# Patient Record
Sex: FEMALE | Race: ASIAN | Employment: UNEMPLOYED | URBAN - METROPOLITAN AREA
[De-identification: names, ages, dates, MRNs, and addresses within clinical notes are randomized per-mention and may not be internally consistent; named-entity substitution may affect disease eponyms.]

---

## 2017-08-24 ENCOUNTER — APPOINTMENT (OUTPATIENT)
Dept: CT IMAGING | Facility: HOSPITAL | Age: 42
End: 2017-08-24
Attending: EMERGENCY MEDICINE
Payer: MEDICAID

## 2017-08-24 ENCOUNTER — HOSPITAL ENCOUNTER (EMERGENCY)
Facility: HOSPITAL | Age: 42
Discharge: HOME | End: 2017-08-24
Attending: EMERGENCY MEDICINE
Payer: MEDICAID

## 2017-08-24 VITALS
SYSTOLIC BLOOD PRESSURE: 106 MMHG | WEIGHT: 152 LBS | TEMPERATURE: 99 F | BODY MASS INDEX: 26 KG/M2 | RESPIRATION RATE: 16 BRPM | HEART RATE: 82 BPM | OXYGEN SATURATION: 100 % | DIASTOLIC BLOOD PRESSURE: 77 MMHG

## 2017-08-24 DIAGNOSIS — R51.9 NONINTRACTABLE EPISODIC HEADACHE, UNSPECIFIED HEADACHE TYPE: Primary | ICD-10-CM

## 2017-08-24 LAB
ALBUMIN SERPL-MCNC: 3.8 G/DL (ref 3.5–4.8)
ALP LIVER SERPL-CCNC: 59 U/L (ref 37–98)
ALT SERPL-CCNC: 28 U/L (ref 14–54)
AST SERPL-CCNC: 18 U/L (ref 15–41)
BASOPHILS # BLD AUTO: 0.04 X10(3) UL (ref 0–0.1)
BASOPHILS NFR BLD AUTO: 0.5 %
BILIRUB SERPL-MCNC: 0.6 MG/DL (ref 0.1–2)
BUN BLD-MCNC: 9 MG/DL (ref 8–20)
CALCIUM BLD-MCNC: 9.9 MG/DL (ref 8.3–10.3)
CHLORIDE: 106 MMOL/L (ref 101–111)
CO2: 28 MMOL/L (ref 22–32)
CREAT BLD-MCNC: 0.88 MG/DL (ref 0.55–1.02)
EOSINOPHIL # BLD AUTO: 0.22 X10(3) UL (ref 0–0.3)
EOSINOPHIL NFR BLD AUTO: 2.7 %
ERYTHROCYTE [DISTWIDTH] IN BLOOD BY AUTOMATED COUNT: 11.9 % (ref 11.5–16)
GLUCOSE BLD-MCNC: 82 MG/DL (ref 70–99)
HCT VFR BLD AUTO: 34.9 % (ref 34–50)
HGB BLD-MCNC: 12.5 G/DL (ref 12–16)
IMMATURE GRANULOCYTE COUNT: 0.02 X10(3) UL (ref 0–1)
IMMATURE GRANULOCYTE RATIO %: 0.2 %
LYMPHOCYTES # BLD AUTO: 3.39 X10(3) UL (ref 0.9–4)
LYMPHOCYTES NFR BLD AUTO: 41.7 %
M PROTEIN MFR SERPL ELPH: 7.6 G/DL (ref 6.1–8.3)
MCH RBC QN AUTO: 32.2 PG (ref 27–33.2)
MCHC RBC AUTO-ENTMCNC: 35.8 G/DL (ref 31–37)
MCV RBC AUTO: 89.9 FL (ref 81–100)
MONOCYTES # BLD AUTO: 0.84 X10(3) UL (ref 0.1–0.6)
MONOCYTES NFR BLD AUTO: 10.3 %
NEUTROPHIL ABS PRELIM: 3.62 X10 (3) UL (ref 1.3–6.7)
NEUTROPHILS # BLD AUTO: 3.62 X10(3) UL (ref 1.3–6.7)
NEUTROPHILS NFR BLD AUTO: 44.6 %
PLATELET # BLD AUTO: 252 10(3)UL (ref 150–450)
POCT LOT NUMBER: NORMAL
POCT URINE PREGNANCY: NEGATIVE
POTASSIUM SERPL-SCNC: 3.4 MMOL/L (ref 3.6–5.1)
RBC # BLD AUTO: 3.88 X10(6)UL (ref 3.8–5.1)
RED CELL DISTRIBUTION WIDTH-SD: 37.9 FL (ref 35.1–46.3)
SODIUM SERPL-SCNC: 140 MMOL/L (ref 136–144)
WBC # BLD AUTO: 8.1 X10(3) UL (ref 4–13)

## 2017-08-24 PROCEDURE — 81025 URINE PREGNANCY TEST: CPT

## 2017-08-24 PROCEDURE — 99284 EMERGENCY DEPT VISIT MOD MDM: CPT

## 2017-08-24 PROCEDURE — 96361 HYDRATE IV INFUSION ADD-ON: CPT

## 2017-08-24 PROCEDURE — 70450 CT HEAD/BRAIN W/O DYE: CPT | Performed by: EMERGENCY MEDICINE

## 2017-08-24 PROCEDURE — 96374 THER/PROPH/DIAG INJ IV PUSH: CPT

## 2017-08-24 PROCEDURE — 85025 COMPLETE CBC W/AUTO DIFF WBC: CPT | Performed by: EMERGENCY MEDICINE

## 2017-08-24 PROCEDURE — 96375 TX/PRO/DX INJ NEW DRUG ADDON: CPT

## 2017-08-24 PROCEDURE — 80053 COMPREHEN METABOLIC PANEL: CPT | Performed by: EMERGENCY MEDICINE

## 2017-08-24 RX ORDER — DIPHENHYDRAMINE HYDROCHLORIDE 50 MG/ML
25 INJECTION INTRAMUSCULAR; INTRAVENOUS ONCE
Status: COMPLETED | OUTPATIENT
Start: 2017-08-24 | End: 2017-08-24

## 2017-08-24 RX ORDER — METOCLOPRAMIDE HYDROCHLORIDE 5 MG/ML
10 INJECTION INTRAMUSCULAR; INTRAVENOUS ONCE
Status: COMPLETED | OUTPATIENT
Start: 2017-08-24 | End: 2017-08-24

## 2017-08-24 NOTE — ED NOTES
Report received from [facility] at this time. Patient resting on cart comfortably, pain decreased. No distress observed. Waiting for CT at this time.

## 2017-08-24 NOTE — ED NOTES
Patient taken to CT department at this time by cart by CT tech. Remains updated with plan of care. Daughter at bedside.

## 2017-08-24 NOTE — ED NOTES
Patient back from CT, resting on cart with family at bedside. No distress observed. Feeling better and pain controlled at this time.

## 2017-08-24 NOTE — ED PROVIDER NOTES
Patient Seen in: [facility] Emergency Department    History   Patient presents with:  Headache (neurologic)    Stated Complaint: headache    HPI    50-year-old female here with her daughter for evaluation of headache. It started yesterday morning. reactive to light. Neck: Normal range of motion. Neck supple. No JVD present. Cardiovascular: Normal rate and regular rhythm. Pulmonary/Chest: Effort normal and breath sounds normal. No stridor. Abdominal: Soft. There is no tenderness.  There is no Abnormal            Final result                 Please view results for these tests on the individual orders.    POCT PREGNANCY, URINE   RAINBOW DRAW BLUE   RAINBOW DRAW GOLD   RAINBOW DRAW LAVENDER   RAINBOW DRAW LIGHT GREEN       ========================

## 2017-08-24 NOTE — ED NOTES
Care of pt assumed from triage holding RN. Pt ambulated to BR, voided specimen to be collected.  Pt reports HA pain is \"better\"

## 2018-02-19 ENCOUNTER — OFFICE VISIT (OUTPATIENT)
Dept: FAMILY MEDICINE CLINIC | Facility: CLINIC | Age: 43
End: 2018-02-19

## 2018-02-19 VITALS
DIASTOLIC BLOOD PRESSURE: 62 MMHG | HEART RATE: 77 BPM | TEMPERATURE: 98 F | BODY MASS INDEX: 26 KG/M2 | SYSTOLIC BLOOD PRESSURE: 98 MMHG | OXYGEN SATURATION: 99 % | WEIGHT: 152 LBS

## 2018-02-19 DIAGNOSIS — J11.1 INFLUENZA-LIKE ILLNESS: Primary | ICD-10-CM

## 2018-02-19 PROCEDURE — 99202 OFFICE O/P NEW SF 15 MIN: CPT | Performed by: FAMILY MEDICINE

## 2018-02-19 RX ORDER — OSELTAMIVIR PHOSPHATE 75 MG/1
75 CAPSULE ORAL 2 TIMES DAILY
Qty: 10 CAPSULE | Refills: 0 | Status: SHIPPED | OUTPATIENT
Start: 2018-02-19 | End: 2018-02-24

## 2018-02-19 NOTE — PATIENT INSTRUCTIONS
Take tamiflu twice daily for 5 days with food. Use otc meds for comfort:  Ibuprofen for pain and fever. otc cough remedies like delsym will reduce coughing without adding mucus.    Consider applying jovita's vapo-rub or eucalpytus oil to chest and feet at

## 2018-02-20 NOTE — PROGRESS NOTES
Patient presents with:  Flu: started yesterday. chills/body aches. mild cough. sore throat.        SUBJECTIVE:   Senthil Licona is a 40year old female accompanied by  who presents complaining of flu-like symptoms of fever to 100, malaise, fatigue, Influenza-like illness (MINDY)  1. Symptomatic/supportive therapy as detailed in AVS  2. Antiviral therapy: tamiflu 75 bid x 5 days. Risks vs benefits of medications, as well as potential side effects, were reviewed.   3. Discussed possibility of second

## 2018-08-28 ENCOUNTER — OFFICE VISIT (OUTPATIENT)
Dept: FAMILY MEDICINE CLINIC | Facility: CLINIC | Age: 43
End: 2018-08-28
Payer: MEDICAID

## 2018-08-28 VITALS
DIASTOLIC BLOOD PRESSURE: 64 MMHG | SYSTOLIC BLOOD PRESSURE: 120 MMHG | TEMPERATURE: 98 F | HEIGHT: 62 IN | WEIGHT: 157 LBS | HEART RATE: 72 BPM | OXYGEN SATURATION: 100 % | BODY MASS INDEX: 28.89 KG/M2

## 2018-08-28 DIAGNOSIS — J06.9 UPPER RESPIRATORY TRACT INFECTION, UNSPECIFIED TYPE: Primary | ICD-10-CM

## 2018-08-28 PROCEDURE — 99213 OFFICE O/P EST LOW 20 MIN: CPT | Performed by: PHYSICIAN ASSISTANT

## 2018-08-28 NOTE — PROGRESS NOTES
CHIEF COMPLAINT:   Patient presents with:  Fever: sore throat, headaches, runny nose, nsoe congestion, watery eyes x 1 dy       HPI:   Danielle Brought is a 44year old female who presents for cold symptoms for 2 days. +runny nose. +headache. +sore throat. unspecified type  (primary encounter diagnosis)      Patient Instructions   Zyrtec OTC   Flonase OTC   Tylenol prn pain   Fluids   Please follow up with PCP if no improvement or if symptoms worsen         The patient indicates understanding of these issues

## 2018-08-28 NOTE — PATIENT INSTRUCTIONS
Zyrtec OTC   Flonase OTC   Tylenol prn pain   Fluids   Please follow up with PCP if no improvement or if symptoms worsen

## 2018-09-18 ENCOUNTER — OFFICE VISIT (OUTPATIENT)
Dept: FAMILY MEDICINE CLINIC | Facility: CLINIC | Age: 43
End: 2018-09-18
Payer: MEDICAID

## 2018-09-18 VITALS
DIASTOLIC BLOOD PRESSURE: 70 MMHG | SYSTOLIC BLOOD PRESSURE: 102 MMHG | HEIGHT: 60.5 IN | WEIGHT: 156.38 LBS | TEMPERATURE: 98 F | HEART RATE: 72 BPM | BODY MASS INDEX: 29.91 KG/M2 | RESPIRATION RATE: 16 BRPM | OXYGEN SATURATION: 99 %

## 2018-09-18 DIAGNOSIS — Z12.39 SCREENING FOR BREAST CANCER: ICD-10-CM

## 2018-09-18 DIAGNOSIS — K64.4 EXTERNAL HEMORRHOID: ICD-10-CM

## 2018-09-18 DIAGNOSIS — Z28.21 REFUSED INFLUENZA VACCINE: ICD-10-CM

## 2018-09-18 DIAGNOSIS — Z00.00 ROUTINE GENERAL MEDICAL EXAMINATION AT A HEALTH CARE FACILITY: Primary | ICD-10-CM

## 2018-09-18 PROCEDURE — 99396 PREV VISIT EST AGE 40-64: CPT | Performed by: FAMILY MEDICINE

## 2018-09-18 NOTE — PATIENT INSTRUCTIONS
Prevention Guidelines, Women Ages 36 to 52  Screening tests and vaccines are an important part of managing your health. A screening test is done to find possible disorders or diseases in people who don't have any symptoms.  The goal is to find a disease e Depression All women in this age group At routine exams   Gonorrhea Sexually active women at increased risk for infection At routine exams   Hepatitis C Anyone at increased risk; 1 time for those born between [facility] At routine exams   High cholester Meningococcal Women at increased risk for infection–talk with your healthcare provider 1 or more doses   Pneumococcal conjugate vaccine (PCV13) and pneumococcal polysaccharide vaccine (PPSV23) Women at increased risk for infection–talk with your healthcare Your healthcare provider may prescribe anti-inflammatory medicine to help ease your symptoms. The following tips will also help relieve pain and swelling. · Take sitz baths. Taking a sitz bath means sitting in a few inches of warm bath water.  Soaking for Drink more water  Along with a high-fiber diet, drinking more water can help ease constipation. This is because insoluble fiber absorbs water, making stools soft and bulky. Be sure to drink plenty of water throughout the day.  Drinking fruit juices, such as © 5952-1204 [employer] [address] [facility], [facility] [place]. All rights reserved. This information is not intended as a substitute for professional medical care. Always follow your healthcare professional's instructions.

## 2018-09-18 NOTE — PROGRESS NOTES
Estefania Lemons is a 44year old female here for Patient presents with:  Establish Care: New patient  Imm/Inj: refused flu vaccine    HPI:   Patient is seen for initial visit. Would like a physical done. Patient is status post LELAND for DUB.   Never had a in stool. : No pain, frequency, urgency or incontinence with urination. OB/GYN: Menses:none, had LELAND  Rheumatologic: no joint pain, swelling, stiffness. No myalgias. Derm/Skin: No rash or atypical skin lesions.   Neuro: No headache, focal neurologic s (two) times daily as needed for Hemorrhoids (for 3-5 days as needed). Refused influenza vaccine    Screening for breast cancer  -     MARIA ESTHER SCREENING BILAT (CPT=77067); Future      Recommend return for annual adult well exam in 1 year.

## 2018-12-20 ENCOUNTER — TELEPHONE (OUTPATIENT)
Dept: FAMILY MEDICINE CLINIC | Facility: CLINIC | Age: 43
End: 2018-12-20

## 2018-12-20 NOTE — TELEPHONE ENCOUNTER
Patient called requesting appointment for hemorrhoid issues. States has itching and pain. Occasionally has some bleeding. No bleeding today or this past week. States used Anusol suppositories that were prescribed at last appt. With little improvement.  Has

## 2018-12-20 NOTE — TELEPHONE ENCOUNTER
[facility] for patient to advise per , she use OTC Preparation H and Witch Hazel pads after BMs. Also advised if she is having issues with constipation, she should increase fluid and fiber intake or can use OTC Miralax or other gentle stool softener.

## 2019-01-02 ENCOUNTER — OFFICE VISIT (OUTPATIENT)
Dept: FAMILY MEDICINE CLINIC | Facility: CLINIC | Age: 44
End: 2019-01-02
Payer: MEDICAID

## 2019-01-02 VITALS
TEMPERATURE: 98 F | HEART RATE: 68 BPM | BODY MASS INDEX: 30 KG/M2 | SYSTOLIC BLOOD PRESSURE: 102 MMHG | DIASTOLIC BLOOD PRESSURE: 68 MMHG | WEIGHT: 156.5 LBS | RESPIRATION RATE: 16 BRPM

## 2019-01-02 DIAGNOSIS — K64.4 EXTERNAL HEMORRHOIDS WITH COMPLICATION: Primary | ICD-10-CM

## 2019-01-02 DIAGNOSIS — K59.09 CHRONIC CONSTIPATION: ICD-10-CM

## 2019-01-02 PROCEDURE — 99213 OFFICE O/P EST LOW 20 MIN: CPT | Performed by: FAMILY MEDICINE

## 2019-01-02 NOTE — PROGRESS NOTES
Early Kennedy is a 44year old female. Patient presents with: Follow - Up: Pt here for f/u of hemorrhoids. Pt states symptoms are the same. Pt would like a referral for specialist.    HPI:   Is seen for follow-up of hemorrhoids.   Accompanied by her da

## 2019-01-03 PROBLEM — K59.09 CHRONIC CONSTIPATION: Status: ACTIVE | Noted: 2019-01-03

## 2019-01-16 ENCOUNTER — OFFICE VISIT (OUTPATIENT)
Dept: SURGERY | Facility: CLINIC | Age: 44
End: 2019-01-16
Payer: MEDICAID

## 2019-01-16 VITALS
HEIGHT: 60 IN | DIASTOLIC BLOOD PRESSURE: 67 MMHG | WEIGHT: 156 LBS | BODY MASS INDEX: 30.63 KG/M2 | HEART RATE: 80 BPM | SYSTOLIC BLOOD PRESSURE: 104 MMHG | TEMPERATURE: 97 F

## 2019-01-16 DIAGNOSIS — K64.8 INTERNAL HEMORRHOIDS WITH COMPLICATION: Primary | ICD-10-CM

## 2019-01-16 DIAGNOSIS — K59.09 CHRONIC CONSTIPATION: ICD-10-CM

## 2019-01-16 DIAGNOSIS — L29.0 PRURITUS ANI: ICD-10-CM

## 2019-01-16 PROCEDURE — 99243 OFF/OP CNSLTJ NEW/EST LOW 30: CPT | Performed by: SURGERY

## 2019-01-16 PROCEDURE — 46600 DIAGNOSTIC ANOSCOPY SPX: CPT | Performed by: SURGERY

## 2019-01-16 NOTE — H&P
New Patient Visit Note       Active Problems      1. Internal hemorrhoids with complication    2. Chronic constipation    3. Pruritus ani        Chief Complaint   Patient presents with:  Hemorrhoids: new pt. / ref.  by Dr. Benja Mendoza / Zuhair Mcclure consult / bu activity: Yes        Partners: Male    Other Topics      Concerns:    Social History Narrative      vegetarian     No current outpatient medications on file. Review of Systems  The Review of Systems has been reviewed by me during today.   Review of Sys anal tone normal. Pelvic exam was performed with patient prone.    Genitourinary Comments: + Rectocele, small  No Rectovaginal Fistula  No Episiotomy  Anal Sphincter Intact  + mild Pruritis Ani  No Lichenification  No Abscess  No Fistula in ano  No Anterior

## 2019-01-17 PROBLEM — K64.8 INTERNAL HEMORRHOIDS WITH COMPLICATION: Status: ACTIVE | Noted: 2018-09-18

## 2019-01-28 ENCOUNTER — OFFICE VISIT (OUTPATIENT)
Dept: SURGERY | Facility: CLINIC | Age: 44
End: 2019-01-28
Payer: MEDICAID

## 2019-01-28 VITALS
DIASTOLIC BLOOD PRESSURE: 70 MMHG | SYSTOLIC BLOOD PRESSURE: 101 MMHG | BODY MASS INDEX: 30.23 KG/M2 | WEIGHT: 154 LBS | HEART RATE: 77 BPM | HEIGHT: 60 IN | TEMPERATURE: 97 F

## 2019-01-28 DIAGNOSIS — K64.8 INTERNAL HEMORRHOIDS WITH COMPLICATION: Primary | ICD-10-CM

## 2019-01-28 PROCEDURE — 46221 LIGATION OF HEMORRHOID(S): CPT | Performed by: SURGERY

## 2019-01-28 NOTE — PROCEDURES
Pre op diagnosis: Symptomatic Internal Hemorrhoids    Post op diagnosis:  Symptomatic Internal Hemorrhoids    Procedure: Anoscopy with O-ring Rubber Band Ligation of Internal Hemorrhoids, 9:00    Surgeon:  Dr. Ce Zamudio    Operative findings:    The

## 2019-01-28 NOTE — PROGRESS NOTES
Follow Up Visit Note       Active Problems      1.  Internal hemorrhoids with complication          Chief Complaint   Patient presents with:  Hemorrhoids: 1st banding         History of Present Illness  The patient is a 50-year-old female who presents today vomiting. Genitourinary: Negative for difficulty urinating, dysuria, frequency and urgency. Musculoskeletal: Negative for arthralgias and myalgias. Skin: Negative for color change and rash. Neurological: Negative for tremors, syncope and weakness.

## 2019-01-29 ENCOUNTER — TELEPHONE (OUTPATIENT)
Dept: SURGERY | Facility: CLINIC | Age: 44
End: 2019-01-29

## 2019-01-29 NOTE — TELEPHONE ENCOUNTER
Pt had her first hemorrhoid rubber banding yesterday. Her first BM after the banding was today and she stated she had quite a bit of blood noted in toilet water afterwards.  I explained to her some bleeding is normal after a banding but to call us back if s

## 2019-02-01 ENCOUNTER — TELEPHONE (OUTPATIENT)
Dept: SURGERY | Facility: CLINIC | Age: 44
End: 2019-02-01

## 2019-02-01 NOTE — TELEPHONE ENCOUNTER
Pt had banding 4 days ago and is stil having some pain and wonders if this is normal. No other complaints. Explained that this can be normal and if it does not improve or gets worse to call the office.

## 2019-02-11 ENCOUNTER — OFFICE VISIT (OUTPATIENT)
Dept: SURGERY | Facility: CLINIC | Age: 44
End: 2019-02-11
Payer: MEDICAID

## 2019-02-11 VITALS
WEIGHT: 154 LBS | BODY MASS INDEX: 30.23 KG/M2 | DIASTOLIC BLOOD PRESSURE: 68 MMHG | HEART RATE: 80 BPM | TEMPERATURE: 98 F | HEIGHT: 60 IN | SYSTOLIC BLOOD PRESSURE: 97 MMHG

## 2019-02-11 DIAGNOSIS — K64.8 INTERNAL HEMORRHOIDS WITH COMPLICATION: Primary | ICD-10-CM

## 2019-02-11 PROCEDURE — 46221 LIGATION OF HEMORRHOID(S): CPT | Performed by: SURGERY

## 2019-02-11 NOTE — PROGRESS NOTES
Follow Up Visit Note       Active Problems      1. Internal hemorrhoids with complication          Chief Complaint   Patient presents with:  Hemorrhoids: 2nd banding - ongoing pain with bowel mvmnts.  - slightly improved after 1st banding procedure nosebleeds, sore throat and trouble swallowing. Respiratory: Negative for apnea, cough, shortness of breath and wheezing. Cardiovascular: Negative for chest pain, palpitations and leg swelling.    Gastrointestinal: Negative for abdominal distention, a weeks for a repeat evaluation and probable rubberbanding. Follow Up  Return in about 2 weeks (around 2/25/2019).     Luh Arce MD

## 2019-02-11 NOTE — PROCEDURES
Pre op diagnosis: Symptomatic Internal Hemorrhoids    Post op diagnosis:  Symptomatic Internal Hemorrhoids    Procedure: Anoscopy with O-ring Rubber Band Ligation of Internal Hemorrhoids, 4:00    Surgeon:  Dr. Jorge Luis Lowe    Operative findings:    The

## 2019-02-18 ENCOUNTER — TELEPHONE (OUTPATIENT)
Dept: FAMILY MEDICINE CLINIC | Facility: CLINIC | Age: 44
End: 2019-02-18

## 2019-02-18 NOTE — TELEPHONE ENCOUNTER
Pt's daughter called stating Pt is having issues with hands & feet at night.  (not pain). Wants to come in today. Pt would not get on phone. Daughter said Mom will explain at appointment.

## 2019-02-18 NOTE — TELEPHONE ENCOUNTER
Returned call to patient's daughter. States this is not an acute issue, it has been going on for awhile. It is not painful, denies any acute weakness, numbness, tingling, etc.  Offered appt. Tomorrow am. Needs afternoon appt.     Future Appointments   Date

## 2019-02-20 ENCOUNTER — OFFICE VISIT (OUTPATIENT)
Dept: FAMILY MEDICINE CLINIC | Facility: CLINIC | Age: 44
End: 2019-02-20
Payer: MEDICAID

## 2019-02-20 VITALS
RESPIRATION RATE: 14 BRPM | WEIGHT: 160.31 LBS | TEMPERATURE: 97 F | HEIGHT: 60 IN | HEART RATE: 92 BPM | BODY MASS INDEX: 31.48 KG/M2 | OXYGEN SATURATION: 99 % | DIASTOLIC BLOOD PRESSURE: 62 MMHG | SYSTOLIC BLOOD PRESSURE: 100 MMHG

## 2019-02-20 DIAGNOSIS — M79.602 LEFT ARM PAIN: Primary | ICD-10-CM

## 2019-02-20 DIAGNOSIS — M79.605 LEFT LEG PAIN: ICD-10-CM

## 2019-02-20 PROCEDURE — 99213 OFFICE O/P EST LOW 20 MIN: CPT | Performed by: FAMILY MEDICINE

## 2019-02-20 NOTE — PROGRESS NOTES
Carlitos Jones is a 44year old female. Patient presents with:  Hand Pain: left side will start to hurt at night when laying down for six months every night  Leg Pain: left side will start to hurt at night when laying down for six months sometimes.     H leg pain  Patient advised to xiomara her labs done to make sure her electrolytes are fine.

## 2019-02-20 NOTE — PATIENT INSTRUCTIONS
Your arm pain is likely muscular, please do the exercises given in the handout regularly for 2 weeks. Please take ibuprofen 200 mg, 2 tablets every night for the next 7-10 days. Pain in your left cough appears to be cramping.   Please get your labs done

## 2019-02-23 ENCOUNTER — LAB ENCOUNTER (OUTPATIENT)
Dept: LAB | Age: 44
End: 2019-02-23
Attending: FAMILY MEDICINE

## 2019-02-23 DIAGNOSIS — Z00.00 ROUTINE GENERAL MEDICAL EXAMINATION AT A HEALTH CARE FACILITY: ICD-10-CM

## 2019-02-23 LAB
ALBUMIN SERPL-MCNC: 3.4 G/DL (ref 3.4–5)
ALBUMIN/GLOB SERPL: 1 {RATIO} (ref 1–2)
ALP LIVER SERPL-CCNC: 59 U/L (ref 37–98)
ALT SERPL-CCNC: 23 U/L (ref 13–56)
ANION GAP SERPL CALC-SCNC: 7 MMOL/L (ref 0–18)
AST SERPL-CCNC: 22 U/L (ref 15–37)
BASOPHILS # BLD AUTO: 0.03 X10(3) UL (ref 0–0.2)
BASOPHILS NFR BLD AUTO: 0.5 %
BILIRUB SERPL-MCNC: 0.5 MG/DL (ref 0.1–2)
BUN BLD-MCNC: 8 MG/DL (ref 7–18)
BUN/CREAT SERPL: 11.3 (ref 10–20)
CALCIUM BLD-MCNC: 9.2 MG/DL (ref 8.5–10.1)
CHLORIDE SERPL-SCNC: 106 MMOL/L (ref 98–107)
CHOLEST SMN-MCNC: 148 MG/DL (ref ?–200)
CO2 SERPL-SCNC: 27 MMOL/L (ref 21–32)
CREAT BLD-MCNC: 0.71 MG/DL (ref 0.55–1.02)
DEPRECATED RDW RBC AUTO: 38.5 FL (ref 35.1–46.3)
EOSINOPHIL # BLD AUTO: 0.17 X10(3) UL (ref 0–0.7)
EOSINOPHIL NFR BLD AUTO: 2.9 %
ERYTHROCYTE [DISTWIDTH] IN BLOOD BY AUTOMATED COUNT: 11.6 % (ref 11–15)
GLOBULIN PLAS-MCNC: 3.4 G/DL (ref 2.8–4.4)
GLUCOSE BLD-MCNC: 89 MG/DL (ref 70–99)
HCT VFR BLD AUTO: 34.9 % (ref 35–48)
HDLC SERPL-MCNC: 54 MG/DL (ref 40–59)
HGB BLD-MCNC: 12.7 G/DL (ref 12–16)
IMM GRANULOCYTES # BLD AUTO: 0.02 X10(3) UL (ref 0–1)
IMM GRANULOCYTES NFR BLD: 0.3 %
LDLC SERPL CALC-MCNC: 78 MG/DL (ref ?–100)
LYMPHOCYTES # BLD AUTO: 1.95 X10(3) UL (ref 1–4)
LYMPHOCYTES NFR BLD AUTO: 33 %
M PROTEIN MFR SERPL ELPH: 6.8 G/DL (ref 6.4–8.2)
MCH RBC QN AUTO: 33 PG (ref 26–34)
MCHC RBC AUTO-ENTMCNC: 36.4 G/DL (ref 31–37)
MCV RBC AUTO: 90.6 FL (ref 80–100)
MONOCYTES # BLD AUTO: 0.63 X10(3) UL (ref 0.1–1)
MONOCYTES NFR BLD AUTO: 10.7 %
NEUTROPHILS # BLD AUTO: 3.11 X10 (3) UL (ref 1.5–7.7)
NEUTROPHILS # BLD AUTO: 3.11 X10(3) UL (ref 1.5–7.7)
NEUTROPHILS NFR BLD AUTO: 52.6 %
NONHDLC SERPL-MCNC: 94 MG/DL (ref ?–130)
OSMOLALITY SERPL CALC.SUM OF ELEC: 288 MOSM/KG (ref 275–295)
PLATELET # BLD AUTO: 261 10(3)UL (ref 150–450)
POTASSIUM SERPL-SCNC: 4.1 MMOL/L (ref 3.5–5.1)
RBC # BLD AUTO: 3.85 X10(6)UL (ref 3.8–5.3)
SODIUM SERPL-SCNC: 140 MMOL/L (ref 136–145)
TRIGL SERPL-MCNC: 81 MG/DL (ref 30–149)
TSI SER-ACNC: 1.62 MIU/ML (ref 0.36–3.74)
VIT D+METAB SERPL-MCNC: 15.2 NG/ML (ref 30–100)
VLDLC SERPL CALC-MCNC: 16 MG/DL (ref 0–30)
WBC # BLD AUTO: 5.9 X10(3) UL (ref 4–11)

## 2019-02-23 PROCEDURE — 80061 LIPID PANEL: CPT | Performed by: FAMILY MEDICINE

## 2019-02-23 PROCEDURE — 82306 VITAMIN D 25 HYDROXY: CPT | Performed by: FAMILY MEDICINE

## 2019-02-23 PROCEDURE — 80050 GENERAL HEALTH PANEL: CPT | Performed by: FAMILY MEDICINE

## 2019-02-25 ENCOUNTER — OFFICE VISIT (OUTPATIENT)
Dept: SURGERY | Facility: CLINIC | Age: 44
End: 2019-02-25
Payer: MEDICAID

## 2019-02-25 VITALS
DIASTOLIC BLOOD PRESSURE: 63 MMHG | WEIGHT: 160 LBS | HEIGHT: 60 IN | TEMPERATURE: 97 F | BODY MASS INDEX: 31.41 KG/M2 | HEART RATE: 64 BPM | SYSTOLIC BLOOD PRESSURE: 97 MMHG

## 2019-02-25 DIAGNOSIS — K59.09 CHRONIC CONSTIPATION: ICD-10-CM

## 2019-02-25 DIAGNOSIS — K60.0 ACUTE ANTERIOR ANAL FISSURE: Primary | ICD-10-CM

## 2019-02-25 PROCEDURE — 99213 OFFICE O/P EST LOW 20 MIN: CPT | Performed by: SURGERY

## 2019-02-25 NOTE — PROGRESS NOTES
Follow Up Visit Note       Active Problems      1. Acute anterior anal fissure    2. Chronic constipation          Chief Complaint   Patient presents with:  Anal Problem (GI):  pt c/o of constipation, rectal pain x 2 days.  denies bleeding        History of swallowing. Respiratory: Negative for apnea, cough, shortness of breath and wheezing. Cardiovascular: Negative for chest pain, palpitations and leg swelling. Gastrointestinal: Positive for rectal pain.  Negative for abdominal distention, abdominal p Because of the pain associated with her fissure, I did not pursue rubberbanding at this time. · I recommended she start a fiber supplement. She was provided with a handout regarding a high-fiber diet, which includes taking a fiber supplement routinely.

## 2019-03-18 ENCOUNTER — OFFICE VISIT (OUTPATIENT)
Dept: SURGERY | Facility: CLINIC | Age: 44
End: 2019-03-18
Payer: MEDICAID

## 2019-03-18 VITALS
WEIGHT: 160 LBS | DIASTOLIC BLOOD PRESSURE: 65 MMHG | HEART RATE: 84 BPM | BODY MASS INDEX: 31.41 KG/M2 | HEIGHT: 60 IN | TEMPERATURE: 98 F | SYSTOLIC BLOOD PRESSURE: 102 MMHG

## 2019-03-18 DIAGNOSIS — K59.09 CHRONIC CONSTIPATION: ICD-10-CM

## 2019-03-18 DIAGNOSIS — K60.0 ACUTE ANTERIOR ANAL FISSURE: Primary | ICD-10-CM

## 2019-03-18 PROCEDURE — 99213 OFFICE O/P EST LOW 20 MIN: CPT | Performed by: SURGERY

## 2019-03-18 NOTE — PROGRESS NOTES
Follow Up Visit Note       Active Problems      1. Acute anterior anal fissure    2. Chronic constipation          Chief Complaint   Patient presents with:  Hemorrhoids: fissure cont. care - possible banding /  still having issues w/bowel mvmnts. , pushes a 4 capsule, Rfl: 2     Review of Systems  The Review of Systems has been reviewed by me during today. Review of Systems   Constitutional: Negative for chills, diaphoresis, fatigue, fever and unexpected weight change.    HENT: Negative for hearing loss, nose normal. Judgment and thought content normal.   Nursing note and vitals reviewed. Assessment   Acute anterior anal fissure  (primary encounter diagnosis)  Chronic constipation    Plan   · The fissure has healed significantly, but not entirely.   I aske

## 2019-04-08 ENCOUNTER — OFFICE VISIT (OUTPATIENT)
Dept: SURGERY | Facility: CLINIC | Age: 44
End: 2019-04-08
Payer: MEDICAID

## 2019-04-08 VITALS
RESPIRATION RATE: 18 BRPM | BODY MASS INDEX: 29.44 KG/M2 | HEIGHT: 62 IN | DIASTOLIC BLOOD PRESSURE: 67 MMHG | TEMPERATURE: 97 F | WEIGHT: 160 LBS | SYSTOLIC BLOOD PRESSURE: 95 MMHG | HEART RATE: 83 BPM

## 2019-04-08 DIAGNOSIS — K64.8 INTERNAL HEMORRHOIDS WITH COMPLICATION: Primary | ICD-10-CM

## 2019-04-08 DIAGNOSIS — K60.0 ACUTE ANTERIOR ANAL FISSURE: ICD-10-CM

## 2019-04-08 DIAGNOSIS — K59.09 CHRONIC CONSTIPATION: ICD-10-CM

## 2019-04-08 PROCEDURE — 99213 OFFICE O/P EST LOW 20 MIN: CPT | Performed by: SURGERY

## 2019-04-08 RX ORDER — POLYETHYLENE GLYCOL 3350 17 G/17G
17 POWDER, FOR SOLUTION ORAL DAILY
COMMUNITY
End: 2019-07-01

## 2019-04-08 NOTE — PROCEDURES
Pre op diagnosis: Symptomatic Internal Hemorrhoids    Post op diagnosis:  Symptomatic Internal Hemorrhoids    Procedure: Anoscopy with O-ring Rubber Band Ligation of Internal Hemorrhoids, 2:00    Surgeon:  Dr. Summers Forth    Operative findings:    The

## 2019-04-08 NOTE — PROGRESS NOTES
Follow Up Visit Note       Active Problems      1. Internal hemorrhoids with complication    2. Chronic constipation    3.  Acute anterior anal fissure          Chief Complaint   Patient presents with:  Anal Problem (GI): Continued care fissure,doing better Frequency: Never      Drug use: No      Sexual activity: Yes        Partners: Male    Other Topics      Concerns:    Social History Narrative      vegetarian       Current Outpatient Medications:   •  NON FORMULARY, ditalizem and lidocainr oint for fi thyroid mass present. Cardiovascular: Normal rate, regular rhythm, S1 normal and S2 normal. Exam reveals no S3.   Pulmonary/Chest: Effort normal. No accessory muscle usage. No tachypnea. No respiratory distress. She has no decreased breath sounds.  She ha and complete.

## 2019-06-21 ENCOUNTER — TELEPHONE (OUTPATIENT)
Dept: FAMILY MEDICINE CLINIC | Facility: CLINIC | Age: 44
End: 2019-06-21

## 2019-06-21 NOTE — TELEPHONE ENCOUNTER
Attempted to return call to patient. No answer and no voicemail available on cell phone number. Called number listed as home number and reached male relative. He will contact patient and tell her to call back.

## 2019-06-22 DIAGNOSIS — E55.9 VITAMIN D DEFICIENCY: ICD-10-CM

## 2019-06-24 NOTE — TELEPHONE ENCOUNTER
LOV  Return in about 2 weeks (around 3/6/2019).        LAST LAB    19  8:15 AM    25-Hydroxyvitamin D (Total) 30.0 - 100.0 ng/mL 15.2Low           LAST RX   ergocalciferol 17342 units Oral Cap () 4 capsule 2 2019 3/30/2019       Next O

## 2019-06-26 ENCOUNTER — OFFICE VISIT (OUTPATIENT)
Dept: FAMILY MEDICINE CLINIC | Facility: CLINIC | Age: 44
End: 2019-06-26
Payer: MEDICAID

## 2019-06-26 ENCOUNTER — TELEPHONE (OUTPATIENT)
Dept: FAMILY MEDICINE CLINIC | Facility: CLINIC | Age: 44
End: 2019-06-26

## 2019-06-26 VITALS
TEMPERATURE: 98 F | DIASTOLIC BLOOD PRESSURE: 68 MMHG | HEIGHT: 61 IN | HEART RATE: 82 BPM | WEIGHT: 163.13 LBS | OXYGEN SATURATION: 99 % | BODY MASS INDEX: 30.8 KG/M2 | RESPIRATION RATE: 18 BRPM | SYSTOLIC BLOOD PRESSURE: 106 MMHG

## 2019-06-26 DIAGNOSIS — R25.2 LEG CRAMP: ICD-10-CM

## 2019-06-26 DIAGNOSIS — R51.9 FRONTAL HEADACHE: Primary | ICD-10-CM

## 2019-06-26 DIAGNOSIS — E55.9 VITAMIN D DEFICIENCY: ICD-10-CM

## 2019-06-26 PROCEDURE — 99213 OFFICE O/P EST LOW 20 MIN: CPT | Performed by: FAMILY MEDICINE

## 2019-06-26 RX ORDER — ERGOCALCIFEROL 1.25 MG/1
CAPSULE ORAL
Qty: 4 CAPSULE | Refills: 0 | OUTPATIENT
Start: 2019-06-26

## 2019-06-26 NOTE — PATIENT INSTRUCTIONS
Please keep a headache diary for 1 month and follow up. Continue to take advil as needed for the headache. Self-Care for Headaches  Most headaches aren't serious and can be relieved with self-care.  But some headaches may be a sign of another health pr definite beginning or end  · Headache after an activity such as driving or working on a computer  Signs of migraine  Any of the following can be signs:  · Throbbing pain on one or both sides of your head  · Nausea or vomiting  · Extreme sensitivity to ligh pills  · Certain medical conditions, such as kidney failure or diabetes  · Pregnancy  Stopping a muscle spasm  Muscle spasms often come and go quickly. When a muscle goes into spasm, very gently stretch and massage the muscle.  This may help calm the muscle

## 2019-06-26 NOTE — TELEPHONE ENCOUNTER
Patient's daughter returned call. States her mother had two episode of generalized headache and vomiting last week. Felt cold but denies fever, diarrhea, URI symptoms or other symptoms. Not related to any particular activity or time of day.   Last episode

## 2019-06-28 NOTE — TELEPHONE ENCOUNTER
LOV  6/26/19   Frontal Headache    LAST LAB 2/23/19   Vit D  15.2    LAST RX 2/28/19  #4  2 refills    Next OV   Future Appointments   Date Time Provider Alejandra Orozco   7/1/2019  2:15 PM Gurjit Tadeo MD [facility] EMG General     PROTOCOL- NONE

## 2019-06-30 RX ORDER — ERGOCALCIFEROL 1.25 MG/1
CAPSULE ORAL
Qty: 4 CAPSULE | Refills: 0 | Status: SHIPPED | OUTPATIENT
Start: 2019-06-30 | End: 2020-01-20

## 2019-07-01 ENCOUNTER — OFFICE VISIT (OUTPATIENT)
Dept: SURGERY | Facility: CLINIC | Age: 44
End: 2019-07-01
Payer: MEDICAID

## 2019-07-01 VITALS
SYSTOLIC BLOOD PRESSURE: 89 MMHG | WEIGHT: 160 LBS | HEART RATE: 76 BPM | HEIGHT: 61 IN | BODY MASS INDEX: 30.21 KG/M2 | DIASTOLIC BLOOD PRESSURE: 62 MMHG | TEMPERATURE: 97 F

## 2019-07-01 DIAGNOSIS — T14.8XXA SUPERFICIAL LACERATION OF SKIN: Primary | ICD-10-CM

## 2019-07-01 PROCEDURE — 99214 OFFICE O/P EST MOD 30 MIN: CPT | Performed by: SURGERY

## 2019-07-01 NOTE — PROGRESS NOTES
Early Kennedy is a 38year old female.   Patient presents with:  Headache: Two headaches last week with nausea,vomiting here to discuss    HPI:   Patient is seen today accompanied by her daughter, complaining of frontal headache, patient states had 2 epi

## 2019-07-01 NOTE — PROGRESS NOTES
Follow Up Visit Note       Active Problems      1.  Superficial laceration of skin          Chief Complaint   Patient presents with:  Hemorrhoids: EST PT, SEEN IN 04/2019 FOR HEMORRHOID BANDINGS, STILL C/O ONGOING HEMORRHOID ISSUES, c/o scar by rectum that Never      Drug use: No      Sexual activity: Yes        Partners: Male    Other Topics      Concerns:    Social History Narrative      vegetarian       Current Outpatient Medications:   •  ERGOCALCIFEROL 28760 units Oral Cap, TAKE 1 CAPSULE BY MOUTH 1 LYNETTE her handout with further details.     Travis Pineda MD

## 2020-01-09 ENCOUNTER — OFFICE VISIT (OUTPATIENT)
Dept: ELECTROPHYSIOLOGY | Facility: HOSPITAL | Age: 45
End: 2020-01-09
Attending: FAMILY MEDICINE
Payer: MEDICAID

## 2020-01-09 DIAGNOSIS — M54.40 ACUTE LEFT-SIDED LOW BACK PAIN WITH SCIATICA, SCIATICA LATERALITY UNSPECIFIED: Primary | ICD-10-CM

## 2020-01-09 DIAGNOSIS — M54.40 LOW BACK PAIN WITH SCIATICA, SCIATICA LATERALITY UNSPECIFIED, UNSPECIFIED BACK PAIN LATERALITY, UNSPECIFIED CHRONICITY: ICD-10-CM

## 2020-01-09 PROBLEM — M54.50 LOW BACK PAIN: Status: ACTIVE | Noted: 2020-01-09

## 2020-01-09 PROCEDURE — 95886 MUSC TEST DONE W/N TEST COMP: CPT | Performed by: OTHER

## 2020-01-09 PROCEDURE — 95909 NRV CNDJ TST 5-6 STUDIES: CPT | Performed by: OTHER

## 2020-01-09 NOTE — PROCEDURES
[address]      PATIENT'S NAME: Olamide Kwabenajared   REFERRING PHYSICIAN: Harry Santizo M.D.    PATIENT ACCOUNT #: [de-identified] LOCATION: [facility]   MEDICAL RECORD #: [id number] DATE OF BIRTH:

## 2020-01-09 NOTE — PROCEDURES
[address]      PATIENT'S NAME: Roz Ghosh   REFERRING PHYSICIAN: Herrera Hammer M.D.    PATIENT ACCOUNT #: [de-identified] LOCATION: [facility]   MEDICAL RECORD #: [id number] DATE OF BIRTH:

## 2020-01-14 ENCOUNTER — OFFICE VISIT (OUTPATIENT)
Dept: FAMILY MEDICINE CLINIC | Facility: CLINIC | Age: 45
End: 2020-01-14
Payer: MEDICAID

## 2020-01-14 VITALS
SYSTOLIC BLOOD PRESSURE: 102 MMHG | TEMPERATURE: 98 F | HEIGHT: 61 IN | OXYGEN SATURATION: 99 % | WEIGHT: 159 LBS | DIASTOLIC BLOOD PRESSURE: 68 MMHG | RESPIRATION RATE: 16 BRPM | HEART RATE: 83 BPM | BODY MASS INDEX: 30.02 KG/M2

## 2020-01-14 DIAGNOSIS — Z12.39 SCREENING FOR BREAST CANCER: ICD-10-CM

## 2020-01-14 DIAGNOSIS — R20.2 PARESTHESIA OF BOTH FEET: ICD-10-CM

## 2020-01-14 DIAGNOSIS — E55.9 VITAMIN D DEFICIENCY: ICD-10-CM

## 2020-01-14 DIAGNOSIS — Z00.00 ROUTINE GENERAL MEDICAL EXAMINATION AT A HEALTH CARE FACILITY: Primary | ICD-10-CM

## 2020-01-14 PROCEDURE — 99396 PREV VISIT EST AGE 40-64: CPT | Performed by: FAMILY MEDICINE

## 2020-01-14 NOTE — PATIENT INSTRUCTIONS
Prevention Guidelines, Women Ages [address] to 52  Screening tests and vaccines are an important part of managing your health. A screening test is done to find diseases in people who don't have any symptoms.  The goal is to find a disease early so lifestyle diamond · Flexible sigmoidoscopy every 5 years, or  · Colonoscopy every 10 years, or  · CT colonography (virtual colonoscopy) every 5 years, or  · Yearly fecal occult blood test, or  · Yearly fecal immunochemical test every year, or  · Stool DNA test, every 3 year Chickenpox (varicella) All women in this age group who have no record of this infection or vaccine 2 doses; the second dose should be given at least 4 weeks after the first dose   Hepatitis A Women at increased risk for infection–talk with your healthcare Use of tobacco and the health effects it can cause All women in this age group Every exam   1 American Diabetes Association  2 American College of Obstetricians and Gynecologists   [address]  [address] [facility]  Date Last R

## 2020-01-14 NOTE — PROGRESS NOTES
Ai Sánchez is a 38year old female here for Patient presents with:  Physical    HPI:   Patient is seen for her annual physical  Patient is status post LELAND for DUB.   Mammogram was not done last year, patient states not sure of family history of breast myalgias. Derm/Skin: No rash or atypical skin lesions. Neuro: No headache, focal neurologic symptom or memory difficulty. Psych: No anxiety, depression, sleep disturbance, panic attacks.     EXAM:     /68   Pulse 83   Temp 97.8 °F (36.6 °C) (Tempor

## 2020-01-18 ENCOUNTER — LAB ENCOUNTER (OUTPATIENT)
Dept: LAB | Age: 45
End: 2020-01-18
Attending: FAMILY MEDICINE
Payer: MEDICAID

## 2020-01-18 DIAGNOSIS — E55.9 VITAMIN D DEFICIENCY: ICD-10-CM

## 2020-01-18 DIAGNOSIS — R20.2 PARESTHESIA OF BOTH FEET: ICD-10-CM

## 2020-01-18 DIAGNOSIS — Z00.00 ROUTINE GENERAL MEDICAL EXAMINATION AT A HEALTH CARE FACILITY: ICD-10-CM

## 2020-01-18 LAB
ALBUMIN SERPL-MCNC: 3.6 G/DL (ref 3.4–5)
ALBUMIN/GLOB SERPL: 1 {RATIO} (ref 1–2)
ALP LIVER SERPL-CCNC: 58 U/L (ref 37–98)
ALT SERPL-CCNC: 22 U/L (ref 13–56)
ANION GAP SERPL CALC-SCNC: 6 MMOL/L (ref 0–18)
AST SERPL-CCNC: 14 U/L (ref 15–37)
BASOPHILS # BLD AUTO: 0.05 X10(3) UL (ref 0–0.2)
BASOPHILS NFR BLD AUTO: 0.8 %
BILIRUB SERPL-MCNC: 0.7 MG/DL (ref 0.1–2)
BUN BLD-MCNC: 7 MG/DL (ref 7–18)
BUN/CREAT SERPL: 8.5 (ref 10–20)
CALCIUM BLD-MCNC: 8.8 MG/DL (ref 8.5–10.1)
CHLORIDE SERPL-SCNC: 108 MMOL/L (ref 98–112)
CHOLEST SMN-MCNC: 163 MG/DL (ref ?–200)
CO2 SERPL-SCNC: 27 MMOL/L (ref 21–32)
CREAT BLD-MCNC: 0.82 MG/DL (ref 0.55–1.02)
DEPRECATED RDW RBC AUTO: 39.2 FL (ref 35.1–46.3)
EOSINOPHIL # BLD AUTO: 0.18 X10(3) UL (ref 0–0.7)
EOSINOPHIL NFR BLD AUTO: 3 %
ERYTHROCYTE [DISTWIDTH] IN BLOOD BY AUTOMATED COUNT: 11.6 % (ref 11–15)
GLOBULIN PLAS-MCNC: 3.5 G/DL (ref 2.8–4.4)
GLUCOSE BLD-MCNC: 96 MG/DL (ref 70–99)
HCT VFR BLD AUTO: 38.6 % (ref 35–48)
HDLC SERPL-MCNC: 52 MG/DL (ref 40–59)
HGB BLD-MCNC: 13.3 G/DL (ref 12–16)
IMM GRANULOCYTES # BLD AUTO: 0.01 X10(3) UL (ref 0–1)
IMM GRANULOCYTES NFR BLD: 0.2 %
LDLC SERPL CALC-MCNC: 98 MG/DL (ref ?–100)
LYMPHOCYTES # BLD AUTO: 2.3 X10(3) UL (ref 1–4)
LYMPHOCYTES NFR BLD AUTO: 38.1 %
M PROTEIN MFR SERPL ELPH: 7.1 G/DL (ref 6.4–8.2)
MCH RBC QN AUTO: 32.2 PG (ref 26–34)
MCHC RBC AUTO-ENTMCNC: 34.5 G/DL (ref 31–37)
MCV RBC AUTO: 93.5 FL (ref 80–100)
MONOCYTES # BLD AUTO: 0.62 X10(3) UL (ref 0.1–1)
MONOCYTES NFR BLD AUTO: 10.3 %
NEUTROPHILS # BLD AUTO: 2.87 X10 (3) UL (ref 1.5–7.7)
NEUTROPHILS # BLD AUTO: 2.87 X10(3) UL (ref 1.5–7.7)
NEUTROPHILS NFR BLD AUTO: 47.6 %
NONHDLC SERPL-MCNC: 111 MG/DL (ref ?–130)
OSMOLALITY SERPL CALC.SUM OF ELEC: 290 MOSM/KG (ref 275–295)
PATIENT FASTING Y/N/NP: YES
PATIENT FASTING Y/N/NP: YES
PLATELET # BLD AUTO: 270 10(3)UL (ref 150–450)
POTASSIUM SERPL-SCNC: 3.9 MMOL/L (ref 3.5–5.1)
RBC # BLD AUTO: 4.13 X10(6)UL (ref 3.8–5.3)
SODIUM SERPL-SCNC: 141 MMOL/L (ref 136–145)
TRIGL SERPL-MCNC: 66 MG/DL (ref 30–149)
TSI SER-ACNC: 1.64 MIU/ML (ref 0.36–3.74)
VIT B12 SERPL-MCNC: 217 PG/ML (ref 193–986)
VIT D+METAB SERPL-MCNC: 26.2 NG/ML (ref 30–100)
VLDLC SERPL CALC-MCNC: 13 MG/DL (ref 0–30)
WBC # BLD AUTO: 6 X10(3) UL (ref 4–11)

## 2020-01-18 PROCEDURE — 80053 COMPREHEN METABOLIC PANEL: CPT

## 2020-01-18 PROCEDURE — 85025 COMPLETE CBC W/AUTO DIFF WBC: CPT

## 2020-01-18 PROCEDURE — 84443 ASSAY THYROID STIM HORMONE: CPT

## 2020-01-18 PROCEDURE — 82306 VITAMIN D 25 HYDROXY: CPT

## 2020-01-18 PROCEDURE — 80061 LIPID PANEL: CPT

## 2020-01-18 PROCEDURE — 82607 VITAMIN B-12: CPT

## 2020-01-26 PROBLEM — E55.9 VITAMIN D DEFICIENCY: Status: ACTIVE | Noted: 2020-01-26

## 2020-01-26 PROBLEM — R20.2 PARESTHESIA OF BOTH FEET: Status: ACTIVE | Noted: 2020-01-26

## 2020-01-28 ENCOUNTER — TELEPHONE (OUTPATIENT)
Dept: FAMILY MEDICINE CLINIC | Facility: CLINIC | Age: 45
End: 2020-01-28

## 2020-01-28 ENCOUNTER — HOSPITAL ENCOUNTER (EMERGENCY)
Facility: HOSPITAL | Age: 45
Discharge: HOME | End: 2020-01-28
Attending: EMERGENCY MEDICINE
Payer: MEDICAID

## 2020-01-28 VITALS
SYSTOLIC BLOOD PRESSURE: 112 MMHG | OXYGEN SATURATION: 100 % | WEIGHT: 159 LBS | TEMPERATURE: 98 F | DIASTOLIC BLOOD PRESSURE: 74 MMHG | BODY MASS INDEX: 28.17 KG/M2 | HEIGHT: 63 IN | RESPIRATION RATE: 18 BRPM | HEART RATE: 82 BPM

## 2020-01-28 DIAGNOSIS — G62.9 PERIPHERAL POLYNEUROPATHY: Primary | ICD-10-CM

## 2020-01-28 LAB
ALBUMIN SERPL-MCNC: 3.4 G/DL (ref 3.4–5)
ALBUMIN/GLOB SERPL: 0.9 {RATIO} (ref 1–2)
ALP LIVER SERPL-CCNC: 60 U/L (ref 37–98)
ALT SERPL-CCNC: 22 U/L (ref 13–56)
ANION GAP SERPL CALC-SCNC: 7 MMOL/L (ref 0–18)
AST SERPL-CCNC: 23 U/L (ref 15–37)
BASOPHILS # BLD AUTO: 0.04 X10(3) UL (ref 0–0.2)
BASOPHILS NFR BLD AUTO: 0.4 %
BILIRUB SERPL-MCNC: 0.5 MG/DL (ref 0.1–2)
BUN BLD-MCNC: 8 MG/DL (ref 7–18)
BUN/CREAT SERPL: 10.3 (ref 10–20)
CALCIUM BLD-MCNC: 8.8 MG/DL (ref 8.5–10.1)
CHLORIDE SERPL-SCNC: 109 MMOL/L (ref 98–112)
CO2 SERPL-SCNC: 26 MMOL/L (ref 21–32)
CREAT BLD-MCNC: 0.78 MG/DL (ref 0.55–1.02)
DEPRECATED RDW RBC AUTO: 38.6 FL (ref 35.1–46.3)
EOSINOPHIL # BLD AUTO: 0.17 X10(3) UL (ref 0–0.7)
EOSINOPHIL NFR BLD AUTO: 1.9 %
ERYTHROCYTE [DISTWIDTH] IN BLOOD BY AUTOMATED COUNT: 11.6 % (ref 11–15)
GLOBULIN PLAS-MCNC: 3.6 G/DL (ref 2.8–4.4)
GLUCOSE BLD-MCNC: 99 MG/DL (ref 70–99)
HCT VFR BLD AUTO: 37.1 % (ref 35–48)
HGB BLD-MCNC: 12.9 G/DL (ref 12–16)
IMM GRANULOCYTES # BLD AUTO: 0.01 X10(3) UL (ref 0–1)
IMM GRANULOCYTES NFR BLD: 0.1 %
LYMPHOCYTES # BLD AUTO: 2.99 X10(3) UL (ref 1–4)
LYMPHOCYTES NFR BLD AUTO: 33.4 %
M PROTEIN MFR SERPL ELPH: 7 G/DL (ref 6.4–8.2)
MCH RBC QN AUTO: 31.8 PG (ref 26–34)
MCHC RBC AUTO-ENTMCNC: 34.8 G/DL (ref 31–37)
MCV RBC AUTO: 91.4 FL (ref 80–100)
MONOCYTES # BLD AUTO: 0.46 X10(3) UL (ref 0.1–1)
MONOCYTES NFR BLD AUTO: 5.1 %
NEUTROPHILS # BLD AUTO: 5.29 X10 (3) UL (ref 1.5–7.7)
NEUTROPHILS # BLD AUTO: 5.29 X10(3) UL (ref 1.5–7.7)
NEUTROPHILS NFR BLD AUTO: 59.1 %
OSMOLALITY SERPL CALC.SUM OF ELEC: 292 MOSM/KG (ref 275–295)
PLATELET # BLD AUTO: 246 10(3)UL (ref 150–450)
POTASSIUM SERPL-SCNC: 3.8 MMOL/L (ref 3.5–5.1)
RBC # BLD AUTO: 4.06 X10(6)UL (ref 3.8–5.3)
SODIUM SERPL-SCNC: 142 MMOL/L (ref 136–145)
TSI SER-ACNC: 1.77 MIU/ML (ref 0.36–3.74)
WBC # BLD AUTO: 9 X10(3) UL (ref 4–11)

## 2020-01-28 PROCEDURE — 85025 COMPLETE CBC W/AUTO DIFF WBC: CPT | Performed by: EMERGENCY MEDICINE

## 2020-01-28 PROCEDURE — 84443 ASSAY THYROID STIM HORMONE: CPT | Performed by: EMERGENCY MEDICINE

## 2020-01-28 PROCEDURE — 99283 EMERGENCY DEPT VISIT LOW MDM: CPT

## 2020-01-28 PROCEDURE — 99284 EMERGENCY DEPT VISIT MOD MDM: CPT

## 2020-01-28 PROCEDURE — 36415 COLL VENOUS BLD VENIPUNCTURE: CPT

## 2020-01-28 PROCEDURE — 80053 COMPREHEN METABOLIC PANEL: CPT | Performed by: EMERGENCY MEDICINE

## 2020-01-28 NOTE — ED INITIAL ASSESSMENT (HPI)
Per daughter pt has been having foot burning and pain top and bottom x 3 months , is supposed to see a neurologist but that would take another month

## 2020-01-28 NOTE — TELEPHONE ENCOUNTER
Specialty Provider's Name? Tino Elizabeth    Specialty?  Neurology    Specialty Provider's Contact Info? [phone]    Reason for Order / Referral? Feet burning on top and bottom and can not get shoes or socks on    Has the patient been seen by their PC

## 2020-01-28 NOTE — TELEPHONE ENCOUNTER
Ok to refer to neurology. Medicaid does not need referral, she needs to check if he will accept her insurance.

## 2020-01-28 NOTE — TELEPHONE ENCOUNTER
Dr. Jay Pineda,  Please advise    Patient was seen by Podiatry, Dr. Freddie Oglesby. on 11/9/19. Referred for EMG (done 1/9/2020) and instructed to F/U in 6 weeks with Dr. Freddie Oglesby.

## 2020-01-28 NOTE — ED PROVIDER NOTES
Patient Seen in: [facility] Emergency Department      History   Patient presents with:   Other    Stated Complaint: burning on top and bottom of feet     HPI    30-year-old female presents emergency room for evaluation of burning sensation to both fe 160 cm (5' 3\")   Wt 72.1 kg   SpO2 100%   BMI 28.17 kg/m²         Physical Exam    GENERAL: Patient is awake, alert, well-appearing, in no acute distress. HEENT:  no scleral icterus. Mucous membranes are moist Scalp is atraumatic.   NECK: Neck is supple, primary care physician requests patient follow-up in her clinic tomorrow and will refer to neurology.   Patient presents with burning sensation in a stocking distribution bilateral feet, has normal neurologic exam.  Return to ER if any change worsening symp

## 2020-01-28 NOTE — TELEPHONE ENCOUNTER
[facility] for patient to request clarification which Neurologist she will be seeing. She requested referral to Dr. Stephon Mauricio in [place], [place]. Patient was seen in the ED today and was referred to Dr. Harley Godfrey.   Advised she technically does not need a r

## 2020-01-29 ENCOUNTER — OFFICE VISIT (OUTPATIENT)
Dept: FAMILY MEDICINE CLINIC | Facility: CLINIC | Age: 45
End: 2020-01-29
Payer: MEDICAID

## 2020-01-29 VITALS
OXYGEN SATURATION: 98 % | HEART RATE: 80 BPM | TEMPERATURE: 98 F | DIASTOLIC BLOOD PRESSURE: 70 MMHG | WEIGHT: 161 LBS | BODY MASS INDEX: 30.4 KG/M2 | HEIGHT: 61 IN | SYSTOLIC BLOOD PRESSURE: 102 MMHG | RESPIRATION RATE: 18 BRPM

## 2020-01-29 DIAGNOSIS — G89.29 CHRONIC BILATERAL LOW BACK PAIN WITHOUT SCIATICA: ICD-10-CM

## 2020-01-29 DIAGNOSIS — E53.8 VITAMIN B12 DEFICIENCY: ICD-10-CM

## 2020-01-29 DIAGNOSIS — M54.50 CHRONIC BILATERAL LOW BACK PAIN WITHOUT SCIATICA: ICD-10-CM

## 2020-01-29 DIAGNOSIS — M79.2 PERIPHERAL NEUROPATHIC PAIN: Primary | ICD-10-CM

## 2020-01-29 PROCEDURE — 99213 OFFICE O/P EST LOW 20 MIN: CPT | Performed by: FAMILY MEDICINE

## 2020-01-29 PROCEDURE — 96372 THER/PROPH/DIAG INJ SC/IM: CPT | Performed by: FAMILY MEDICINE

## 2020-01-29 RX ORDER — GABAPENTIN 300 MG/1
300 CAPSULE ORAL NIGHTLY
Qty: 30 CAPSULE | Refills: 0 | Status: SHIPPED | OUTPATIENT
Start: 2020-01-29 | End: 2020-02-28

## 2020-01-29 RX ORDER — CYANOCOBALAMIN 1000 UG/ML
1000 INJECTION INTRAMUSCULAR; SUBCUTANEOUS ONCE
Status: COMPLETED | OUTPATIENT
Start: 2020-01-29 | End: 2020-01-29

## 2020-01-29 RX ADMIN — CYANOCOBALAMIN 1000 MCG: 1000 INJECTION INTRAMUSCULAR; SUBCUTANEOUS at 13:50:00

## 2020-01-29 NOTE — PROGRESS NOTES
Monica Manuel is a 38year old female. Patient presents with:  ER F/U    HPI:   Patient is seen for ER follow up.     Complaining of burning sensation in both feet for the past 6 months, started in the plantar aspect of her feet, states felt better for 6. 0   RBC      3.80 - 5.30 x10(6)uL 4.13   Hemoglobin      12.0 - 16.0 g/dL 13.3   Hematocrit      35.0 - 48.0 % 38.6   Platelet Count      468.8 - 450.0 10(3)uL 270.0   MCV      80.0 - 100.0 fL 93.5   MCH      26.0 - 34.0 pg 32.2   MCHC      31.0 - 37.0 g 217   Vitamin D, 25OH, Total      30.0 - 100.0 ng/mL 26.2 (L)     ASSESSMENT AND PLAN:   Dhruv Coles was seen today for er f/u. Diagnoses and all orders for this visit:    Peripheral neuropathic pain  Comments:  bilateral feet.   Orders:  -     gabapentin

## 2020-01-29 NOTE — PATIENT INSTRUCTIONS
Please take over the counter vitamin B12 1000 mg daily.     Keep follow up appointment with neurologist.

## 2020-02-10 ENCOUNTER — OFFICE VISIT (OUTPATIENT)
Dept: NEUROLOGY | Facility: CLINIC | Age: 45
End: 2020-02-10
Payer: MEDICAID

## 2020-02-10 VITALS
WEIGHT: 166 LBS | BODY MASS INDEX: 31 KG/M2 | DIASTOLIC BLOOD PRESSURE: 60 MMHG | HEART RATE: 66 BPM | RESPIRATION RATE: 14 BRPM | SYSTOLIC BLOOD PRESSURE: 110 MMHG

## 2020-02-10 DIAGNOSIS — R52 BURNING PAIN: ICD-10-CM

## 2020-02-10 DIAGNOSIS — M54.40 LOW BACK PAIN WITH SCIATICA, SCIATICA LATERALITY UNSPECIFIED, UNSPECIFIED BACK PAIN LATERALITY, UNSPECIFIED CHRONICITY: ICD-10-CM

## 2020-02-10 DIAGNOSIS — R20.2 PARESTHESIA OF BOTH FEET: Primary | ICD-10-CM

## 2020-02-10 PROCEDURE — 99214 OFFICE O/P EST MOD 30 MIN: CPT | Performed by: OTHER

## 2020-02-10 RX ORDER — NAPROXEN SODIUM 550 MG/1
550 TABLET ORAL 2 TIMES DAILY WITH MEALS
Qty: 60 TABLET | Refills: 1 | Status: SHIPPED | OUTPATIENT
Start: 2020-02-10

## 2020-02-10 NOTE — PROGRESS NOTES
[address]   Neurology; INITIAL CLINIC VISIT  2/10/2020, 1:03 PM     Kassidy Barrow Patient Status:  No patient class for patient encounter    1975 MRN [id number]   Location [address] [facility] SYSTEMS:    GENERAL HEALTH:  feels well, calm, normal appetite,   EYES: no visual complaints or deficits  HEENT: denies nasal congestion, sinus pain or sore throat; no  hearing loss;  RESPIRATORY: denies shortness of breath, wheezing or cough   CARDIOVASCU No nystagmus, no ptosis       CN V: Masseters strong, Facial sensations normal,        CN  VII:  Symmetric facial movement       CN VIII:  Normal hearing       CN IX, XI:  Normal Gag, uvula palate midline       CN XII:  SCM strong, equal shoulder sh Neurological Exam today is normal  EMG is normal,   I feel she has lower lumbar radiculopathy, related to DJD,         Work up included MRI of lumbar spine  Back PT is given,   Naproxen 500 mg bid. Return in about 4 weeks (around 3/9/2020).     We d

## 2020-02-13 ENCOUNTER — TELEPHONE (OUTPATIENT)
Dept: SURGERY | Facility: CLINIC | Age: 45
End: 2020-02-13

## 2020-02-13 NOTE — TELEPHONE ENCOUNTER
Prior Auth denied for MRI lumbar [id number]    [id number]    Based on [employer] Guidelines Section: SP 6.1 Lower Extremity Pain with Neurological Features (Radiculopathy or Radiculitis or Plexopathy and/or Neuropathy) with or without Low Back ( following information:  - Your name.  - Your member number.  - A phone number where we can reach you.   - Why you think we should change the decision.  - Medical information to support the request

## 2020-02-28 ENCOUNTER — TELEPHONE (OUTPATIENT)
Dept: FAMILY MEDICINE CLINIC | Facility: CLINIC | Age: 45
End: 2020-02-28

## 2020-02-28 NOTE — TELEPHONE ENCOUNTER
Patient would like the nerve testing that she had done previously. Stated Neuro told her to contact PCP office.

## 2020-03-02 NOTE — TELEPHONE ENCOUNTER
Dr. Olinda Meade,  Please advise    Unsure what nerve test patient is referring to. Just had EMG 1/9/2020. MRI ordered by neuro was not authorized.

## 2020-03-04 ENCOUNTER — OFFICE VISIT (OUTPATIENT)
Dept: PHYSICAL THERAPY | Facility: HOSPITAL | Age: 45
End: 2020-03-04
Attending: Other
Payer: MEDICAID

## 2020-03-04 DIAGNOSIS — M54.40 LOW BACK PAIN WITH SCIATICA, SCIATICA LATERALITY UNSPECIFIED, UNSPECIFIED BACK PAIN LATERALITY, UNSPECIFIED CHRONICITY: ICD-10-CM

## 2020-03-04 PROCEDURE — 97162 PT EVAL MOD COMPLEX 30 MIN: CPT

## 2020-03-04 NOTE — PROGRESS NOTES
SPINE EVALUATION:   Referring Physician: Dr. Baudilio Agosto  Diagnosis: Low Back Pain, RODNEY Foot Pain     Date of Service: 3/4/2020     PATIENT SUMMARY   Juan F Martell is a 38year old female who presents to therapy today with complaints of low back pain with in feet was not reproduced with back mobility but was relieved with hamstring stretching. Pt and PT discussed evaluation findings, pathology, POC and HEP. Pt voiced understanding and performs HEP correctly without reported pain.  Skilled Physical Therapy i 2x daily - 7x weekly  Supine Hamstring Stretch with Strap - 3 reps - 1 sets - 2x daily - 7x weekly    Charges: PT Eval Moderate Complexity      Total Timed Treatment: 6 min     Total Treatment Time: 45 min     Based on clinical rationale and outcome measur

## 2020-03-06 ENCOUNTER — OFFICE VISIT (OUTPATIENT)
Dept: PHYSICAL THERAPY | Facility: HOSPITAL | Age: 45
End: 2020-03-06
Attending: Other
Payer: MEDICAID

## 2020-03-06 PROCEDURE — 97110 THERAPEUTIC EXERCISES: CPT

## 2020-03-06 NOTE — PROGRESS NOTES
Dx: Low Back Pain, RODNEY Foot Pain         Insurance (Authorized # of Visits):  6          Authorizing Physician: Dr. Elvira Linda  Next MD visit: none scheduled  Fall Risk: standard         Precautions: n/a             Subjective: Gary interpretor [facility] for [facility] with Strap - 3 reps - 1 sets - 2x daily - 7x weekly    Charges: there ex x3       Total Timed Treatment: 45 min  Total Treatment Time: 45 min

## 2020-03-08 DIAGNOSIS — M79.2 PERIPHERAL NEUROPATHIC PAIN: ICD-10-CM

## 2020-03-10 DIAGNOSIS — M79.2 PERIPHERAL NEUROPATHIC PAIN: ICD-10-CM

## 2020-03-10 RX ORDER — GABAPENTIN 300 MG/1
CAPSULE ORAL
Qty: 30 CAPSULE | Refills: 0 | OUTPATIENT
Start: 2020-03-10

## 2020-03-11 ENCOUNTER — OFFICE VISIT (OUTPATIENT)
Dept: PHYSICAL THERAPY | Facility: HOSPITAL | Age: 45
End: 2020-03-11
Attending: Other
Payer: MEDICAID

## 2020-03-11 PROCEDURE — 97140 MANUAL THERAPY 1/> REGIONS: CPT

## 2020-03-11 PROCEDURE — 97110 THERAPEUTIC EXERCISES: CPT

## 2020-03-11 NOTE — PROGRESS NOTES
Dx: Low Back Pain, RODNEY Foot Pain         Insurance (Authorized # of Visits):  6          Authorizing Physician: Dr. Reagan Garcia  Next MD visit: none scheduled  Fall Risk: standard         Precautions: n/a             Subjective: Gary [facility] rotation 10x  HS stretching with strap 2x30 sec  Lateral HS stretching with strap 2x30 sec ea  Sciatic flossing 10x ea  Unilateral lumbar rotation stretching 3x10 sec ea  Seated thoracic extension over towel roll 3x      X       HEP:   Access Code: Clare Cerna

## 2020-03-13 ENCOUNTER — OFFICE VISIT (OUTPATIENT)
Dept: PHYSICAL THERAPY | Facility: HOSPITAL | Age: 45
End: 2020-03-13
Attending: Other
Payer: MEDICAID

## 2020-03-13 PROCEDURE — 97110 THERAPEUTIC EXERCISES: CPT

## 2020-03-13 PROCEDURE — 97140 MANUAL THERAPY 1/> REGIONS: CPT

## 2020-03-13 NOTE — PROGRESS NOTES
Dx: Low Back Pain, RODNEY Foot Pain         Insurance (Authorized # of Visits):  6          Authorizing Physician: Dr. Susanna Fisher  Next MD visit: none scheduled  Fall Risk: standard         Precautions: n/a             Subjective: Gary interpretor used for subjec sec ea  Bridge 2x10 with red band around knees  sidelying clamshell no resistance 2x10 ea  Seated lumbar flexion ball rolls 10x  Seated lateral lumbar flexion ball rolls 10x  Calf stretching against  There ex  Lower trunk rotation 10x  HS stretching with s

## 2020-03-17 ENCOUNTER — TELEPHONE (OUTPATIENT)
Dept: PHYSICAL THERAPY | Facility: HOSPITAL | Age: 45
End: 2020-03-17

## 2020-03-17 NOTE — TELEPHONE ENCOUNTER
Contacted pt, spoke to her daughter to discuss department's initiative to protect all patients from COVID-19 exposure. Discussed recommendations relative to pt's home program. Explained that the clinic is cancelling visits for the next two weeks.  Future ap

## 2020-03-18 ENCOUNTER — TELEPHONE (OUTPATIENT)
Dept: PHYSICAL THERAPY | Facility: HOSPITAL | Age: 45
End: 2020-03-18

## 2020-03-18 ENCOUNTER — APPOINTMENT (OUTPATIENT)
Dept: PHYSICAL THERAPY | Facility: HOSPITAL | Age: 45
End: 2020-03-18
Attending: Other
Payer: MEDICAID

## 2020-03-20 ENCOUNTER — TELEPHONE (OUTPATIENT)
Dept: NEUROLOGY | Facility: CLINIC | Age: 45
End: 2020-03-20

## 2020-03-25 ENCOUNTER — APPOINTMENT (OUTPATIENT)
Dept: PHYSICAL THERAPY | Facility: HOSPITAL | Age: 45
End: 2020-03-25
Attending: Other
Payer: MEDICAID

## 2020-03-27 ENCOUNTER — APPOINTMENT (OUTPATIENT)
Dept: PHYSICAL THERAPY | Facility: HOSPITAL | Age: 45
End: 2020-03-27
Attending: Other
Payer: MEDICAID

## 2020-04-01 ENCOUNTER — APPOINTMENT (OUTPATIENT)
Dept: PHYSICAL THERAPY | Facility: HOSPITAL | Age: 45
End: 2020-04-01
Attending: Other
Payer: MEDICAID

## 2020-04-16 ENCOUNTER — APPOINTMENT (OUTPATIENT)
Dept: PHYSICAL THERAPY | Facility: HOSPITAL | Age: 45
End: 2020-04-16
Attending: Other
Payer: MEDICAID

## 2020-04-18 DIAGNOSIS — E55.9 VITAMIN D DEFICIENCY: ICD-10-CM

## 2020-04-21 RX ORDER — ERGOCALCIFEROL 1.25 MG/1
CAPSULE ORAL
Qty: 4 CAPSULE | Refills: 2 | OUTPATIENT
Start: 2020-04-21

## 2021-08-19 ENCOUNTER — OFFICE VISIT (OUTPATIENT)
Dept: FAMILY MEDICINE CLINIC | Facility: CLINIC | Age: 46
End: 2021-08-19
Payer: MEDICAID

## 2021-08-19 VITALS
RESPIRATION RATE: 18 BRPM | SYSTOLIC BLOOD PRESSURE: 111 MMHG | BODY MASS INDEX: 29.44 KG/M2 | OXYGEN SATURATION: 100 % | HEART RATE: 80 BPM | DIASTOLIC BLOOD PRESSURE: 74 MMHG | WEIGHT: 160 LBS | TEMPERATURE: 98 F | HEIGHT: 62 IN

## 2021-08-19 DIAGNOSIS — J01.10 ACUTE NON-RECURRENT FRONTAL SINUSITIS: Primary | ICD-10-CM

## 2021-08-19 PROCEDURE — 3078F DIAST BP <80 MM HG: CPT | Performed by: NURSE PRACTITIONER

## 2021-08-19 PROCEDURE — 3074F SYST BP LT 130 MM HG: CPT | Performed by: NURSE PRACTITIONER

## 2021-08-19 PROCEDURE — 99213 OFFICE O/P EST LOW 20 MIN: CPT | Performed by: NURSE PRACTITIONER

## 2021-08-19 PROCEDURE — 3008F BODY MASS INDEX DOCD: CPT | Performed by: NURSE PRACTITIONER

## 2021-08-19 RX ORDER — AMOXICILLIN AND CLAVULANATE POTASSIUM 875; 125 MG/1; MG/1
1 TABLET, FILM COATED ORAL 2 TIMES DAILY
Qty: 14 TABLET | Refills: 0 | Status: SHIPPED | OUTPATIENT
Start: 2021-08-19 | End: 2021-08-26

## 2021-08-19 NOTE — PROGRESS NOTES
CHIEF COMPLAINT:   Patient presents with:  Sinus Problem      HPI:   Juan F Martell is a 40year old female who presents for sinus congestion for  3  days. Symptoms have been worsening since onset.  Sinus congestion/pain is described as a pressure and atraumatic, normocephalic,  + tenderness on palpation of left frontal sinuses  EYES: conjunctiva clear, EOM intact  EARS: TM's purulent, +scarring, + fluid, bony landmarks obscured  NOSE: nostrils patent, clear nasal mucous, nasal mucosa reddened and swoll Sinusitis can occur during a cold. It can also happen due to allergies to pollens and other particles in the air. Sinusitis can cause symptoms of sinus congestion and a feeling of fullness. A sinus infection causes fever, headache, and facial pain.  There i your sinusitis. Talk with your provider or pharmacist if you have any questions about the medicine. .  · Don't use nasal rinses or irrigation during an acute sinus infection, unless your healthcare provider tells you to.  Rinsing may spread the infection to plan.

## 2021-08-20 LAB — SARS-COV-2 RNA RESP QL NAA+PROBE: NOT DETECTED

## 2023-09-01 ENCOUNTER — ORDER TRANSCRIPTION (OUTPATIENT)
Dept: PHYSICAL THERAPY | Facility: HOSPITAL | Age: 48
End: 2023-09-01

## 2023-09-01 DIAGNOSIS — M54.2 NECK PAIN: Primary | ICD-10-CM

## 2024-08-09 ENCOUNTER — PATIENT OUTREACH (OUTPATIENT)
Dept: FAMILY MEDICINE CLINIC | Facility: CLINIC | Age: 49
End: 2024-08-09

## 2024-08-12 ENCOUNTER — PATIENT OUTREACH (OUTPATIENT)
Dept: FAMILY MEDICINE CLINIC | Facility: CLINIC | Age: 49
End: 2024-08-12

## 2024-08-13 ENCOUNTER — PATIENT OUTREACH (OUTPATIENT)
Dept: CASE MANAGEMENT | Age: 49
End: 2024-08-13

## 2024-08-13 NOTE — PROCEDURES
The office order for PCP removal request is Approved and finalized on August 13, 2024.    Removed Geovanna Francois MD as the patient's Primary Care Physician

## 2025-05-13 NOTE — LETTER
10/22/18        Yinka Ruiz [address]      Dear Raeann Torres,    [address] records indicate that you have outstanding lab work and or testing that was ordered for you and has not yet been completed:  Orders Placed This E bay # 2/on unit

## (undated) NOTE — Clinical Note
I had the pleasure of seeing Ellen Ryan on 7/1/2019. Please see my attached note. Juan Corbett MD [facility]--Surgery

## (undated) NOTE — ED AVS SNAPSHOT
Igor Grief   MRN: [id number]    Department:  [facility] Emergency Department   Date of Visit:  8/24/2017           Disclosure     Insurance plans vary and the physician(s) referred by the ER may not be covered by your plan.  Please contact yo If you have been prescribed any medication(s), please fill your prescription right away and begin taking the medication(s) as directed    If the emergency physician has read X-rays, these will be re-interpreted by a radiologist.  If there is a significant

## (undated) NOTE — Clinical Note
I had the pleasure of seeing Marshall Craig on 4/8/2019. Please see my attached note. Armida Lundborg, MD [facility]--Surgery

## (undated) NOTE — Clinical Note
I had the pleasure of seeing Vida Reyna on 2/11/2019. Please see my attached note. Larissa Bertrand MD [facility]--Surgery

## (undated) NOTE — MR AVS SNAPSHOT
After Visit Summary   1/9/2020    Vida Reyna    MRN: [id number]           Visit Information     Date & Time  1/9/2020  2:30 PM Provider  Smita Barnes MD Department  [address] [place] [address] Dept.  Phone  [phone]      Allergies as of Get your heart pumping – brisk walking, biking, swimming Even 10 minute increments are effective and add up over the week   2 ½ hours per week – spread out over time Use a mukund to keep you motivated   Don’t forget strength training with weights and resist Monday – Friday  8:00 am – 8:00 pm   Saturday – Sunday  8:00 am – 4:00 pm    WALK-IN CARE  Primary Care Providers  Treatment for acute illness   or injury that are   non-life-threatening.   Also available by appointment     Average cost  $70*   IMMEDIATE CA

## (undated) NOTE — Clinical Note
I had the pleasure of seeing Quinn Navarro on 2/25/2019. Please see my attached note. Codie Sexton MD [facility]--Surgery

## (undated) NOTE — Clinical Note
I had the pleasure of seeing Sherryle Riles on 1/28/2019. Please see my attached note. Nolan Howard MD [facility]--Surgery

## (undated) NOTE — Clinical Note
I had the pleasure of seeing Vida Reyna on 3/18/2019. Please see my attached note. Bryanna Berkowitz MD [facility]--Surgery

## (undated) NOTE — ED AVS SNAPSHOT
Senthil Licona   MRN: [id number]    Department:  [facility] Emergency Department   Date of Visit:  1/28/2020           Disclosure     Insurance plans vary and the physician(s) referred by the ER may not be covered by your plan.  Please contact yo tell this physician (or your personal doctor if your instructions are to return to your personal doctor) about any new or lasting problems. The primary care or specialist physician will see patients referred from the [facility] Emergency Department.  Eh Stevens

## (undated) NOTE — Clinical Note
I had the pleasure of seeing Thais Kyle on 1/16/2019. Please see my attached note. Katlyn Barr MD [facility]--Surgery

## (undated) NOTE — LETTER
02/13/20        [address] [facility]      Dear Hans Chase,    Our records indicate that you have outstanding lab work and or testing that was ordered for you and has not yet been completed:  Orders Placed This Encounter